# Patient Record
Sex: FEMALE | Race: BLACK OR AFRICAN AMERICAN
[De-identification: names, ages, dates, MRNs, and addresses within clinical notes are randomized per-mention and may not be internally consistent; named-entity substitution may affect disease eponyms.]

---

## 2019-03-19 ENCOUNTER — HOSPITAL ENCOUNTER (EMERGENCY)
Dept: HOSPITAL 54 - ER | Age: 24
Discharge: HOME | End: 2019-03-19
Payer: COMMERCIAL

## 2019-03-19 VITALS — BODY MASS INDEX: 28.86 KG/M2 | WEIGHT: 147 LBS | HEIGHT: 60 IN

## 2019-03-19 VITALS — SYSTOLIC BLOOD PRESSURE: 123 MMHG | DIASTOLIC BLOOD PRESSURE: 78 MMHG

## 2019-03-19 DIAGNOSIS — F41.9: ICD-10-CM

## 2019-03-19 DIAGNOSIS — J11.1: Primary | ICD-10-CM

## 2019-03-19 DIAGNOSIS — B34.9: ICD-10-CM

## 2019-03-19 PROCEDURE — 94640 AIRWAY INHALATION TREATMENT: CPT

## 2019-03-19 PROCEDURE — 71045 X-RAY EXAM CHEST 1 VIEW: CPT

## 2019-03-19 PROCEDURE — 87804 INFLUENZA ASSAY W/OPTIC: CPT

## 2019-03-19 PROCEDURE — 99284 EMERGENCY DEPT VISIT MOD MDM: CPT

## 2019-03-19 NOTE — NUR
PT BIBS. C/O "BODY ACHES, CHILLS, SOB X2 DAYS" -N/V -DIZZY. PT IS AAOX4, NOT IN 
RESPIRATORY DISTRESS, V/S STABLE, KEPT RESTED AND COMFORTABLE, WILL CONTINUE TO 
MONITOR.